# Patient Record
Sex: MALE | Race: WHITE | Employment: OTHER | URBAN - METROPOLITAN AREA
[De-identification: names, ages, dates, MRNs, and addresses within clinical notes are randomized per-mention and may not be internally consistent; named-entity substitution may affect disease eponyms.]

---

## 2022-06-29 ENCOUNTER — ANESTHESIA EVENT (OUTPATIENT)
Dept: OPERATING ROOM | Age: 67
End: 2022-06-29
Payer: MEDICARE

## 2022-06-29 RX ORDER — EZETIMIBE 10 MG/1
10 TABLET ORAL DAILY
COMMUNITY

## 2022-06-29 RX ORDER — FOLIC ACID 1 MG/1
1 TABLET ORAL DAILY
COMMUNITY

## 2022-06-29 RX ORDER — GLIPIZIDE 5 MG/1
10 TABLET ORAL
COMMUNITY

## 2022-06-29 RX ORDER — DOXYCYCLINE HYCLATE 100 MG/1
100 CAPSULE ORAL 2 TIMES DAILY
COMMUNITY

## 2022-06-29 NOTE — PROGRESS NOTES
.Covid screen done - 6/29/22    Surgery is at [facility] on 6/30/22 . You will be called on  6/29/22 between 1430 and 1600 to confirm surgery and arrival times. 1. Do not eat or drink anything after midnight - unless instructed by your doctor prior to surgery. This includes no water, chewing gum or mints. 2. Follow your directions as prescribed by the doctor for your procedure and medications. 3. Check with your Doctor regarding stopping vitamins, supplements, blood thinners (Plavix, Coumadin, Lovenox, Effient, Pradaxa, Xarelto, Fragmin or other blood thinners) and follow their instructions. Stop all supplements and herbals until after surgery. Morning of surgery take metformin with a sip of water. 4. Do not smoke, and do not drink any alcoholic beverages 24 hours prior to surgery. This includes NA Beer. 5. You may brush your teeth and gargle the morning of surgery. DO NOT SWALLOW WATER    6. You MUST make arrangements for a responsible adult to take you home after your surgery and be able to check on you every couple hours for the day. You will not be allowed to leave alone or drive yourself home. It is strongly suggested someone stay with you the first 24  hrs. Your surgery will be cancelled if you do not have a ride home. 7. Please wear simple, loose fitting clothing to the hospital.  Giovanni Bars not bring valuables (money, credit cards, checkbooks, etc.) Do not wear any makeup (including no eye makeup) or nail polish on your fingers or toes. 8. DO NOT wear any jewelry or piercings on day of surgery. All body piercing jewelry must be removed. 9. If you have dentures, they will be removed before going to the OR; we will provide you a container. If you wear contact lenses or glasses,  they will be removed; please bring a case for them. 10. If you  have a Living Will and Durable Power of  for Healthcare, please bring in a copy.               11. Please bring picture ID,  insurance card, paperwork from the doctors office    (H & P, Consent, & card for implantable devices). 12. Take a shower the night before or morning of your procedure, do not apply any lotion, oil or powder. It is recommended to use Hibiclens or CHG wash during pre-op shower/bath.              13. Wear a mask covering your nose & mouth when entering the hospital.

## 2022-06-29 NOTE — ANESTHESIA PRE PROCEDURE
Department of Anesthesiology  Preprocedure Note       Name:  Rosa Espinosa   Age:  78 y.o.  :  1955                                          MRN:  [id number]         Date:  2022      Surgeon: José Gonzales):  Davon Srivastava DPM    Procedure: Procedure(s):  RIGHT 2ND TOE AMPUTATION  RIGHT TOE FOOT DEBRIDEMENT INCISION AND DRAINAGE OF ALL NON-VIABLE TISSUE    Medications prior to admission:   Prior to Admission medications    Medication Sig Start Date End Date Taking? Authorizing Provider   metFORMIN (GLUCOPHAGE) 1000 MG tablet Take 1,000 mg by mouth   Yes Historical Provider, MD   glipiZIDE (GLUCOTROL) 5 MG tablet Take 10 mg by mouth 2 times daily (before meals)   Yes Historical Provider, MD   ezetimibe (ZETIA) 10 MG tablet Take 10 mg by mouth daily   Yes Historical Provider, MD   doxycycline hyclate (VIBRAMYCIN) 100 MG capsule Take 100 mg by mouth 2 times daily   Yes Historical Provider, MD   folic acid (FOLVITE) 1 MG tablet Take 1 mg by mouth daily   Yes Historical Provider, MD       Current medications:    No current facility-administered medications for this encounter. Current Outpatient Medications   Medication Sig Dispense Refill    metFORMIN (GLUCOPHAGE) 1000 MG tablet Take 1,000 mg by mouth      glipiZIDE (GLUCOTROL) 5 MG tablet Take 10 mg by mouth 2 times daily (before meals)      ezetimibe (ZETIA) 10 MG tablet Take 10 mg by mouth daily      doxycycline hyclate (VIBRAMYCIN) 100 MG capsule Take 100 mg by mouth 2 times daily      folic acid (FOLVITE) 1 MG tablet Take 1 mg by mouth daily         Allergies:  No Known Allergies    Problem List:  There is no problem list on file for this patient.       Past Medical History:        Diagnosis Date    Diabetes mellitus ([address]     Hyperlipidemia        Past Surgical History:        Procedure Laterality Date    KNEE ARTHROSCOPY W/ MEDIAL COLLATERAL LIGAMENT (MCL) REPAIR         Social History:    Social History     Tobacco Use    Smoking status: Former Smoker     Quit date: 2022     Years since quittin.0    Smokeless tobacco: Never Used   Substance Use Topics    Alcohol use: Not Currently                                Counseling given: Not Answered      Vital Signs (Current):   Vitals:    22 0914   Weight: 212 lb (96.2 kg)   Height: 5' 10\" (1.778 m)                                              BP Readings from Last 3 Encounters:   No data found for BP       NPO Status:                                                                                 BMI:   Wt Readings from Last 3 Encounters:   22 212 lb (96.2 kg)     Body mass index is 30.42 kg/m². CBC: No results found for: WBC, RBC, HGB, HCT, MCV, RDW, PLT    CMP: No results found for: NA, K, CL, CO2, BUN, CREATININE, GFRAA, AGRATIO, LABGLOM, GLUCOSE, GLU, PROT, CALCIUM, BILITOT, ALKPHOS, AST, ALT    POC Tests: No results for input(s): POCGLU, POCNA, POCK, POCCL, POCBUN, POCHEMO, POCHCT in the last 72 hours.     Coags: No results found for: PROTIME, INR, APTT    HCG (If Applicable): No results found for: PREGTESTUR, PREGSERUM, HCG, HCGQUANT     ABGs: No results found for: PHART, PO2ART, GMA9UWD, MNM7ITZ, BEART, G8SBFDKI     Type & Screen (If Applicable):  No results found for: LABABO, LABRH    Drug/Infectious Status (If Applicable):  No results found for: HIV, HEPCAB    COVID-19 Screening (If Applicable): No results found for: COVID19        Anesthesia Evaluation  Patient summary reviewed no history of anesthetic complications:   Airway: Mallampati: II  TM distance: >3 FB   Neck ROM: full  Mouth opening: > = 3 FB   Dental: normal exam         Pulmonary:                             ROS comment: Former smoker   Cardiovascular:  Exercise tolerance: good (>4 METS),   (+) hyperlipidemia         Beta Blocker:  Not on Beta Blocker         Neuro/Psych:   (+) neuromuscular disease (diabetic neuropathy):,             GI/Hepatic/Renal: Neg GI/Hepatic/Renal ROS            Endo/Other:    (+)

## 2022-06-29 NOTE — PROGRESS NOTES
Called and confirmed with patient surgery at [facility]  on 6/30/22 at 1030 with an arrival time of 0830. Come into the Main entrance and check in. You can bring two people with you and wear a mask. Patient verbalized understanding.

## 2022-06-30 ENCOUNTER — ANESTHESIA (OUTPATIENT)
Dept: OPERATING ROOM | Age: 67
End: 2022-06-30
Payer: MEDICARE

## 2022-06-30 ENCOUNTER — HOSPITAL ENCOUNTER (OUTPATIENT)
Age: 67
Setting detail: OUTPATIENT SURGERY
Discharge: HOME | End: 2022-06-30
Attending: STUDENT IN AN ORGANIZED HEALTH CARE EDUCATION/TRAINING PROGRAM | Admitting: STUDENT IN AN ORGANIZED HEALTH CARE EDUCATION/TRAINING PROGRAM
Payer: MEDICARE

## 2022-06-30 VITALS
TEMPERATURE: 96.3 F | DIASTOLIC BLOOD PRESSURE: 78 MMHG | WEIGHT: 212 LBS | BODY MASS INDEX: 30.35 KG/M2 | OXYGEN SATURATION: 98 % | HEART RATE: 87 BPM | SYSTOLIC BLOOD PRESSURE: 115 MMHG | RESPIRATION RATE: 18 BRPM | HEIGHT: 70 IN

## 2022-06-30 DIAGNOSIS — G89.18 POST-OPERATIVE PAIN: Primary | ICD-10-CM

## 2022-06-30 DIAGNOSIS — M86.171 ACUTE OSTEOMYELITIS OF RIGHT ANKLE OR FOOT (HCC): ICD-10-CM

## 2022-06-30 DIAGNOSIS — Z01.818 ENCOUNTER FOR PREADMISSION TESTING: ICD-10-CM

## 2022-06-30 LAB — GLUCOSE BLD-MCNC: 124 MG/DL (ref 70–99)

## 2022-06-30 PROCEDURE — 3600000012 HC SURGERY LEVEL 2 ADDTL 15MIN: Performed by: STUDENT IN AN ORGANIZED HEALTH CARE EDUCATION/TRAINING PROGRAM

## 2022-06-30 PROCEDURE — 87070 CULTURE OTHR SPECIMN AEROBIC: CPT

## 2022-06-30 PROCEDURE — 88311 DECALCIFY TISSUE: CPT

## 2022-06-30 PROCEDURE — 87075 CULTR BACTERIA EXCEPT BLOOD: CPT

## 2022-06-30 PROCEDURE — 7100000010 HC PHASE II RECOVERY - FIRST 15 MIN: Performed by: STUDENT IN AN ORGANIZED HEALTH CARE EDUCATION/TRAINING PROGRAM

## 2022-06-30 PROCEDURE — 2580000003 HC RX 258: Performed by: ANESTHESIOLOGY

## 2022-06-30 PROCEDURE — 2500000003 HC RX 250 WO HCPCS: Performed by: STUDENT IN AN ORGANIZED HEALTH CARE EDUCATION/TRAINING PROGRAM

## 2022-06-30 PROCEDURE — 2500000003 HC RX 250 WO HCPCS

## 2022-06-30 PROCEDURE — 2580000003 HC RX 258: Performed by: STUDENT IN AN ORGANIZED HEALTH CARE EDUCATION/TRAINING PROGRAM

## 2022-06-30 PROCEDURE — 3700000001 HC ADD 15 MINUTES (ANESTHESIA): Performed by: STUDENT IN AN ORGANIZED HEALTH CARE EDUCATION/TRAINING PROGRAM

## 2022-06-30 PROCEDURE — 2709999900 HC NON-CHARGEABLE SUPPLY: Performed by: STUDENT IN AN ORGANIZED HEALTH CARE EDUCATION/TRAINING PROGRAM

## 2022-06-30 PROCEDURE — 87205 SMEAR GRAM STAIN: CPT

## 2022-06-30 PROCEDURE — 2580000003 HC RX 258

## 2022-06-30 PROCEDURE — 7100000011 HC PHASE II RECOVERY - ADDTL 15 MIN: Performed by: STUDENT IN AN ORGANIZED HEALTH CARE EDUCATION/TRAINING PROGRAM

## 2022-06-30 PROCEDURE — 3700000000 HC ANESTHESIA ATTENDED CARE: Performed by: STUDENT IN AN ORGANIZED HEALTH CARE EDUCATION/TRAINING PROGRAM

## 2022-06-30 PROCEDURE — 6360000002 HC RX W HCPCS: Performed by: STUDENT IN AN ORGANIZED HEALTH CARE EDUCATION/TRAINING PROGRAM

## 2022-06-30 PROCEDURE — 88305 TISSUE EXAM BY PATHOLOGIST: CPT

## 2022-06-30 PROCEDURE — 82962 GLUCOSE BLOOD TEST: CPT

## 2022-06-30 PROCEDURE — 3600000002 HC SURGERY LEVEL 2 BASE: Performed by: STUDENT IN AN ORGANIZED HEALTH CARE EDUCATION/TRAINING PROGRAM

## 2022-06-30 RX ORDER — HALOPERIDOL 5 MG/ML
1 INJECTION INTRAMUSCULAR
Status: CANCELLED | OUTPATIENT
Start: 2022-06-30 | End: 2022-06-30

## 2022-06-30 RX ORDER — DIPHENHYDRAMINE HYDROCHLORIDE 50 MG/ML
12.5 INJECTION INTRAMUSCULAR; INTRAVENOUS
Status: CANCELLED | OUTPATIENT
Start: 2022-06-30 | End: 2022-06-30

## 2022-06-30 RX ORDER — FENTANYL CITRATE 50 UG/ML
50 INJECTION, SOLUTION INTRAMUSCULAR; INTRAVENOUS EVERY 5 MIN PRN
Status: CANCELLED | OUTPATIENT
Start: 2022-06-30

## 2022-06-30 RX ORDER — PROPOFOL 10 MG/ML
INJECTION, EMULSION INTRAVENOUS PRN
Status: DISCONTINUED | OUTPATIENT
Start: 2022-06-30 | End: 2022-06-30 | Stop reason: SDUPTHER

## 2022-06-30 RX ORDER — BUPIVACAINE HYDROCHLORIDE 5 MG/ML
INJECTION, SOLUTION EPIDURAL; INTRACAUDAL
Status: COMPLETED | OUTPATIENT
Start: 2022-06-30 | End: 2022-06-30

## 2022-06-30 RX ORDER — ASPIRIN 81 MG/1
81 TABLET ORAL DAILY
COMMUNITY

## 2022-06-30 RX ORDER — SODIUM CHLORIDE, SODIUM LACTATE, POTASSIUM CHLORIDE, CALCIUM CHLORIDE 600; 310; 30; 20 MG/100ML; MG/100ML; MG/100ML; MG/100ML
INJECTION, SOLUTION INTRAVENOUS CONTINUOUS PRN
Status: DISCONTINUED | OUTPATIENT
Start: 2022-06-30 | End: 2022-06-30 | Stop reason: SDUPTHER

## 2022-06-30 RX ORDER — LABETALOL HYDROCHLORIDE 5 MG/ML
10 INJECTION, SOLUTION INTRAVENOUS
Status: CANCELLED | OUTPATIENT
Start: 2022-06-30

## 2022-06-30 RX ORDER — SODIUM CHLORIDE, SODIUM LACTATE, POTASSIUM CHLORIDE, CALCIUM CHLORIDE 600; 310; 30; 20 MG/100ML; MG/100ML; MG/100ML; MG/100ML
INJECTION, SOLUTION INTRAVENOUS ONCE
Status: COMPLETED | OUTPATIENT
Start: 2022-06-30 | End: 2022-06-30

## 2022-06-30 RX ORDER — OXYCODONE HYDROCHLORIDE 5 MG/1
5 TABLET ORAL
Status: CANCELLED | OUTPATIENT
Start: 2022-06-30 | End: 2022-06-30

## 2022-06-30 RX ORDER — IPRATROPIUM BROMIDE AND ALBUTEROL SULFATE 2.5; .5 MG/3ML; MG/3ML
1 SOLUTION RESPIRATORY (INHALATION)
Status: CANCELLED | OUTPATIENT
Start: 2022-06-30 | End: 2022-06-30

## 2022-06-30 RX ORDER — LIDOCAINE HYDROCHLORIDE 20 MG/ML
INJECTION, SOLUTION EPIDURAL; INFILTRATION; INTRACAUDAL; PERINEURAL PRN
Status: DISCONTINUED | OUTPATIENT
Start: 2022-06-30 | End: 2022-06-30 | Stop reason: SDUPTHER

## 2022-06-30 RX ORDER — MIDAZOLAM HYDROCHLORIDE 2 MG/2ML
2 INJECTION, SOLUTION INTRAMUSCULAR; INTRAVENOUS
Status: CANCELLED | OUTPATIENT
Start: 2022-06-30 | End: 2022-06-30

## 2022-06-30 RX ORDER — HYDROCODONE BITARTRATE AND ACETAMINOPHEN 5; 325 MG/1; MG/1
1 TABLET ORAL EVERY 4 HOURS PRN
Qty: 20 TABLET | Refills: 0 | Status: SHIPPED | OUTPATIENT
Start: 2022-06-30 | End: 2022-07-05

## 2022-06-30 RX ORDER — PROCHLORPERAZINE EDISYLATE 5 MG/ML
5 INJECTION INTRAMUSCULAR; INTRAVENOUS
Status: CANCELLED | OUTPATIENT
Start: 2022-06-30 | End: 2022-06-30

## 2022-06-30 RX ORDER — MEPERIDINE HYDROCHLORIDE 25 MG/ML
12.5 INJECTION INTRAMUSCULAR; INTRAVENOUS; SUBCUTANEOUS EVERY 5 MIN PRN
Status: CANCELLED | OUTPATIENT
Start: 2022-06-30

## 2022-06-30 RX ORDER — HYDRALAZINE HYDROCHLORIDE 20 MG/ML
10 INJECTION INTRAMUSCULAR; INTRAVENOUS
Status: CANCELLED | OUTPATIENT
Start: 2022-06-30

## 2022-06-30 RX ORDER — PROPOFOL 10 MG/ML
INJECTION, EMULSION INTRAVENOUS CONTINUOUS PRN
Status: DISCONTINUED | OUTPATIENT
Start: 2022-06-30 | End: 2022-06-30 | Stop reason: SDUPTHER

## 2022-06-30 RX ADMIN — PROPOFOL 30 MG: 10 INJECTION, EMULSION INTRAVENOUS at 12:11

## 2022-06-30 RX ADMIN — PROPOFOL 70 MCG/KG/MIN: 10 INJECTION, EMULSION INTRAVENOUS at 12:02

## 2022-06-30 RX ADMIN — SODIUM CHLORIDE, POTASSIUM CHLORIDE, SODIUM LACTATE AND CALCIUM CHLORIDE: 600; 310; 30; 20 INJECTION, SOLUTION INTRAVENOUS at 09:32

## 2022-06-30 RX ADMIN — LIDOCAINE HYDROCHLORIDE 100 MG: 20 INJECTION, SOLUTION EPIDURAL; INFILTRATION; INTRACAUDAL; PERINEURAL at 12:02

## 2022-06-30 RX ADMIN — PROPOFOL 70 MG: 10 INJECTION, EMULSION INTRAVENOUS at 12:02

## 2022-06-30 RX ADMIN — CEFAZOLIN 2000 MG: 2 INJECTION, POWDER, FOR SOLUTION INTRAMUSCULAR; INTRAVENOUS at 11:54

## 2022-06-30 RX ADMIN — SODIUM CHLORIDE, POTASSIUM CHLORIDE, SODIUM LACTATE AND CALCIUM CHLORIDE: 600; 310; 30; 20 INJECTION, SOLUTION INTRAVENOUS at 11:57

## 2022-06-30 ASSESSMENT — PAIN SCALES - GENERAL
PAINLEVEL_OUTOF10: 0

## 2022-06-30 NOTE — PROGRESS NOTES
1300 - Patient  A+Ox4 VSS (see doc flow), assessment completed as per doc flow. Patient has no c/o pain,patient denies any needs at this time. Beverage offered. Call light in reach, bed in low position. RN to continue to monitor. Will call to waiting room for visitor/family. 1326 - Patient discharge instructions and prescriptions reviewed and verified. RN reviewed d/c instructions with patient and spouse. All questions answered. Prescription information given to patient. Discharge paperwork signed by RN and patient's spouse. [phone] - Discharged to car  via wheelchair, home with spouse.

## 2022-06-30 NOTE — PROGRESS NOTES
1234 Patient arrived back to [facility]. Report given to this nurse from [facility]. Patient A&O no pain. Beverage of choice offered to patient. Call light in reach and bed in lowest position.      1245 Report given to Jyoti Rausch

## 2022-06-30 NOTE — ANESTHESIA POSTPROCEDURE EVALUATION
Department of Anesthesiology  Postprocedure Note    Patient: Marlyn Barnes  MRN: [id number]  YOB: 1955  Date of evaluation: 6/30/2022      Procedure Summary     Date: 06/30/22 Room / Location: [address] [place] [address]    Anesthesia Start: 1157 Anesthesia Stop: 1234    Procedure: RIGHT 2ND TOE AMPUTATION (Right Second Toe) Diagnosis:       Acute osteomyelitis of right ankle or foot ([address]      (Acute osteomyelitis of right ankle or foot ([address] Chiquis Meade)    Surgeons: Elly Srinivasan DPM Responsible Provider: Elba Conrad MD    Anesthesia Type: MAC, general ASA Status: 3          Anesthesia Type: No value filed.     Mariel Phase I:      Mariel Phase II:        Anesthesia Post Evaluation    Patient location during evaluation: bedside  Patient participation: complete - patient participated  Level of consciousness: awake and alert  Pain score: 0  Airway patency: patent  Nausea & Vomiting: no vomiting and no nausea  Complications: no  Cardiovascular status: hemodynamically stable  Respiratory status: room air  Hydration status: stable

## 2022-06-30 NOTE — H&P
Podiatric Foot and Ankle Surgery H&P      HISTORY OF PRESENT ILLNESS:    The patient with significant past medical history of type 2 diabetes mellitus with peripheral neuropathy and hyperlipidemia who presents with chronic wound to the second toe of the right foot. I have been seeing him for the past several weeks in the wound care center. The wound has not been healing and he has clinical signs of osteomyelitis with exposed bone to the right second toe. We did discuss partial second toe amputation as a treatment option and he would like to proceed with this. Has had nothing to eat or drink since midnight anticipation for surgery today. All questions were answered to his and his wife's satisfaction. Past Medical History:        Diagnosis Date    Diabetes mellitus ([address]     Hyperlipidemia      Past Surgical History:        Procedure Laterality Date    KNEE ARTHROSCOPY W/ MEDIAL COLLATERAL LIGAMENT (MCL) REPAIR  2017      POLYSACCHARIDE:[id number]}    Medications Prior to Admission:   Medications Prior to Admission: aspirin EC 81 MG EC tablet, Take 81 mg by mouth daily  metFORMIN (GLUCOPHAGE) 1000 MG tablet, Take 1,000 mg by mouth  glipiZIDE (GLUCOTROL) 5 MG tablet, Take 10 mg by mouth 2 times daily (before meals)  ezetimibe (ZETIA) 10 MG tablet, Take 10 mg by mouth daily  doxycycline hyclate (VIBRAMYCIN) 100 MG capsule, Take 100 mg by mouth 2 times daily  folic acid (FOLVITE) 1 MG tablet, Take 1 mg by mouth daily    Allergies:  Patient has no known allergies. Social History:   Denies  Family History:   History reviewed. No pertinent family history.   REVIEW OF SYSTEMS:    Negative other than HPI    PHYSICAL EXAM:    VITALS:  /70   Pulse 79   Temp 97 °F (36.1 °C) (Temporal)   Resp 16   Ht 5' 10\" (1.778 m)   Wt 212 lb (96.2 kg)   SpO2 96%   BMI 30.42 kg/m²     Neurovascular status unchanged from previous assessment earlier in the week  Dermatologic: Full-thickness wound with exposed bone of the second toe of the right foot. Erythema to the area continues to improve. No crepitus drainage malodor or fluctuance. No proximal erythematous streaking  Musculoskeletal: Minimal pain on palpation to the second toe of the right foot      Assessment:  -Osteomyelitis second toe right foot  -Diabetic foot ulceration down to the level of bone without necrosis second toe right foot  -Type 2 diabetes mellitus with peripheral neuropathy    Plan:  -Patient was seen and evaluated this morning in the preoperative holding area. Wife was present for this  -Plan for partial second toe amputation with excisional debridement of all nonviable bone and tissue to the right foot.  -All risks, benefits, complication of the procedure were explained to the patient with full understanding. No guarantees were given or implied.  -Prescription for pain medication called into the patient's pharmacy  -Patient is at high risk for wound healing and possible worsening infection given his history of uncontrolled diabetes with chronic wound formation and underlying bone infection. He does understand this  -Weightbearing as tolerated to right foot in surgical shoe postoperatively  -To keep surgical dressing clean dry and intact until first change Wednesday in the wound care center  -Any concerns before then to call the office or go to the emergency room  -Please contact any questions      Wilman Rico DPM    [facility] [address] [place] [address] and Ankle Surgery          Surgery was discussed at length today with the patient. This included the potential risks, conditions as well as the postoperative course. The risks include but are not limited to: Pain, infection, swelling, worsening or no better.   We rediscussed the risk for skin complications or wound complication/nerve related complications and/or bone related complications, hardware complications, bone healing complications, failure of procedure, recurrence, future surgery required, death, anesthesia risks. Blood clots as well as other numerous risks were discussed at length today. Patient understands all this and is agreeable. The postoperative course was explained in detail today as well as the weightbearing status, the need to keep the bandage clean, dry, and intact, and other post procedural specific care was all discussed at length. Postoperative instruction was given and reviewed with the patient. Surgical consents were reviewed today and signed appropriately. We discussed anticoagulation therapy and DVT prophylaxis. This was discussed at length. Proper prescription to be given to patient based on our discussion today. We discussed the signs and symptoms of DVT and PE at length.

## 2022-07-01 NOTE — OP NOTE
Operative Note      Patient: Jesus Curtis  YOB: 1955  MRN: [id number]    Date of Procedure: 6/30/2022    Pre-Op Diagnosis: Acute osteomyelitis of right ankle or foot ([address] Roula Rodriguez    Post-Op Diagnosis: Same       Procedure(s):  RIGHT 2ND TOE AMPUTATION    Surgeon(s):  Patricia Arevalo DPM    Assistant:   * No surgical staff found *    Anesthesia: Monitor Anesthesia Care    Estimated Blood Loss (mL): Minimal    Complications: None    Specimens:   ID Type Source Tests Collected by Time Destination   1 : RIGHT SECOND TOE WOUND CULTURE Tissue Tissue CULTURE, SURGICAL Patricia Arevalo DPM 6/30/2022 1218    A : RIGHT SECOND TOE Specimen Toe SURGICAL PATHOLOGY Patricia Arevalo DPM 6/30/2022 1219        Implants:  * No implants in log *      Drains: * No LDAs found *    Findings: Do believe that all residual infection was removed. Mild to moderate bleeding noted to the surgical site. No deep abscess noted. Detailed Description of Procedure: This is a patient who I have been seeing in the wound care center for concern of wound to the second toe of the right foot. Recent evaluation shows exposed bone and x-ray concern for underlying bone infection. Discussed with patient conservative and surgical treatment options moving forward. He would like to proceed with surgical partial amputation of the second digit. Patient has had nothing to eat or drink since midnight anticipation for surgery today. All risks, benefits, complications of the procedure were explained to the patient with his full understanding. No guarantees are given or implied. All questions answered to the patient and his wife satisfaction in the preoperative holding area. Patient was taken the preoperative holding area to the operating room and kept on his hospital stretcher.  2 g of IV Ancef was administered by the anesthesia service. The right lower extremities marked prepped and draped in usual sterile aseptic fashion.   A timeout was then performed and the appropriate patient identification information and procedure to be performed was confirmed by myself and the operating room team.  Everybody agreed. At this time attention was directed to the second toe of the right foot. 10 cc of half percent Marcaine plain was injected as a digital and metatarsal ring block about the second metatarsal.  A fishmouth incision was then made about the central aspect of the shaft of the proximal phalanx of the second toe of the right foot proximal to the area of the wound. Full-thickness incision down to level of bone was made with a #15 blade. All soft tissue attachments were freed up to gain access to the midshaft of the proximal phalanx. Sagittal saw was utilized to transect the bone at the midshaft and disarticulate the area distally to this. This was then passed to the back table. Deep soft tissue microbiology culture sent today to help guide antibiotic coverage if needed in the future. The incision was then flushed with copious months of sterile saline. Do believe that the area was down to healthy bone. No deep abscess was noted. Mild to moderate bleeding was noted to the surgical site. Skin edges were then reapproximated in a tensionless fashion with 4-0 nylon suture. Dry sterile dressing was applied to the right foot and surgical shoe was applied. Patient tolerated the procedure and anesthesia well. Taken the operating room to the PACU with vital signs stable neurovascular status intact. Patient be weightbearing as tolerated to right foot in a surgical shoe. To keep the dressing clean dry and intact until I see him next week in the wound care center for first postoperative visit. Prescription for pain medication called into the patient's pharmacy. Discussed signs and symptoms of DVT/PE. Patient has any of these concerns to call the office or go to emergency room immediately. Please contact any questions.       Ricardo Farmer, EMERALD    [facility] [address] [place] [address] and Ankle Surgery      Electronically signed by Evelyn Wright DPM on 7/1/2022 at 11:27 AM

## 2022-07-05 LAB
CULTURE: NORMAL
Lab: NORMAL
SPECIMEN: NORMAL

## (undated) DEVICE — SPONGE LAP W18XL18IN WHT COT 4 PLY FLD STRUNG RADPQ DISP ST

## (undated) DEVICE — YANKAUER,FLEXIBLE HANDLE,REGLR CAPACITY: Brand: MEDLINE INDUSTRIES, INC.

## (undated) DEVICE — SYRINGE IRRIG 60ML SFT PLIABLE BLB EZ TO GRP 1 HND USE W/

## (undated) DEVICE — GLOVE ORANGE PI 8   MSG9080

## (undated) DEVICE — GLOVE SURG SZ 8 L12IN THK75MIL DK GRN LTX FREE

## (undated) DEVICE — NEEDLE FLTR 19GA L1.5IN WALL THK5UM BRN POLYPR HUB S STL

## (undated) DEVICE — SUTURE NONABSORBABLE MONOFILAMENT 4-0 PS-2 18 IN BLK ETHILON 1667G

## (undated) DEVICE — GOWN,ECLIPSE,POLYRNF,BRTHSLV,L,30/CS: Brand: MEDLINE

## (undated) DEVICE — DRAPE,EXTREMITY,89X128,STERILE: Brand: MEDLINE

## (undated) DEVICE — SET,IRRIGATION,CYSTO/TUR: Brand: MEDLINE

## (undated) DEVICE — SUTURE ETHLN SZ 3-0 L18IN NONABSORBABLE BLK FS-1 L24MM 3/8 663H

## (undated) DEVICE — SPONGE GZ W4XL8IN COT WVN 12 PLY

## (undated) DEVICE — PACK,BASIC,SIRUS,V: Brand: MEDLINE

## (undated) DEVICE — SWAB CULT SGL AMIES W/O CHAR FOR THRT VAG SKIN HRT CULTSWAB

## (undated) DEVICE — SHEET,DRAPE,53X77,STERILE: Brand: MEDLINE

## (undated) DEVICE — SYRINGE MED 10ML LUERLOCK TIP W/O SFTY DISP

## (undated) DEVICE — SUTURE VCRL SZ 4-0 L27IN ABSRB UD L17MM RB-1 1/2 CIR J214H

## (undated) DEVICE — DRESSING,GAUZE,XEROFORM,CURAD,1"X8",ST: Brand: CURAD

## (undated) DEVICE — SUTURE VCRL SZ 3-0 L27IN ABSRB UD L17MM RB-1 1/2 CIR J215H

## (undated) DEVICE — PENCIL ES CRD L10FT HND SWCHING ROCK SWCH W/ EDGE COAT BLDE

## (undated) DEVICE — SUTURE ETHLN SZ 4-0 L18IN NONABSORBABLE BLK L19MM PS-2 3/8 1667H

## (undated) DEVICE — BANDAGE,GAUZE,BULKEE II,4.5"X4.1YD,STRL: Brand: MEDLINE

## (undated) DEVICE — COUNTER NDL 30 COUNT FOAM STRP SGL MAG

## (undated) DEVICE — NEEDLE HYPO 25GA L1.5IN BLU POLYPR HUB S STL REG BVL STR

## (undated) DEVICE — TRAY PREP DRY W/ PREM GLV 2 APPL 6 SPNG 2 UNDPD 1 OVERWRAP

## (undated) DEVICE — GLOVE ORANGE PI 7 1/2   MSG9075

## (undated) DEVICE — TOWEL,OR,DSP,ST,BLUE,STD,6/PK,12PK/CS: Brand: MEDLINE

## (undated) DEVICE — GLOVE SURG SZ 6 THK91MIL LTX FREE SYN POLYISOPRENE ANTI

## (undated) DEVICE — THIN OFFSET (9.0 X 0.38 X 25.0MM)

## (undated) DEVICE — GAUZE,SPONGE,4"X4",16PLY,XRAY,STRL,LF: Brand: MEDLINE

## (undated) DEVICE — MARKER SURG SKIN UTIL REGULAR/FINE 2 TIP W/ RUL AND 9 LBL